# Patient Record
Sex: MALE | Race: WHITE | URBAN - METROPOLITAN AREA
[De-identification: names, ages, dates, MRNs, and addresses within clinical notes are randomized per-mention and may not be internally consistent; named-entity substitution may affect disease eponyms.]

---

## 2017-04-04 RX ORDER — SERTRALINE HYDROCHLORIDE 50 MG/1
50 TABLET, FILM COATED ORAL DAILY
Qty: 90 TAB | Refills: 2 | Status: SHIPPED | OUTPATIENT
Start: 2017-04-04 | End: 2018-01-05 | Stop reason: SDUPTHER

## 2017-09-18 DIAGNOSIS — E78.5 HYPERLIPIDEMIA LDL GOAL <100: Primary | ICD-10-CM

## 2017-11-18 LAB
CHOLEST SERPL-MCNC: 238 MG/DL (ref 100–199)
HDLC SERPL-MCNC: 34 MG/DL
INTERPRETATION, 910389: NORMAL
LDLC SERPL CALC-MCNC: 137 MG/DL (ref 0–99)
TRIGL SERPL-MCNC: 336 MG/DL (ref 0–149)
VLDLC SERPL CALC-MCNC: 67 MG/DL (ref 5–40)

## 2018-01-05 RX ORDER — SERTRALINE HYDROCHLORIDE 50 MG/1
TABLET, FILM COATED ORAL
Qty: 90 TAB | Refills: 0 | Status: SHIPPED | OUTPATIENT
Start: 2018-01-05 | End: 2018-05-05 | Stop reason: SDUPTHER

## 2018-03-12 ENCOUNTER — OFFICE VISIT (OUTPATIENT)
Dept: INTERNAL MEDICINE CLINIC | Age: 43
End: 2018-03-12

## 2018-03-12 VITALS
HEART RATE: 88 BPM | BODY MASS INDEX: 29.35 KG/M2 | HEIGHT: 74 IN | DIASTOLIC BLOOD PRESSURE: 70 MMHG | OXYGEN SATURATION: 98 % | TEMPERATURE: 97.8 F | RESPIRATION RATE: 14 BRPM | SYSTOLIC BLOOD PRESSURE: 110 MMHG | WEIGHT: 228.7 LBS

## 2018-03-12 DIAGNOSIS — F32.1 MODERATE SINGLE CURRENT EPISODE OF MAJOR DEPRESSIVE DISORDER (HCC): ICD-10-CM

## 2018-03-12 DIAGNOSIS — E78.5 HYPERLIPIDEMIA LDL GOAL <100: Primary | ICD-10-CM

## 2018-03-12 NOTE — PROGRESS NOTES
Subjective:     Chief Complaint   Patient presents with   [address]     He  is a 40y.o. year old male who presents for evaluation. Has a history of HLD. Is pescevegan now. Since last year in January is exercising regularly (walking). No cardiovascular symptoms. Is doing well on sertraline. Historical Data    Past Medical History:   Diagnosis Date   [facility] fever         No past surgical history on file. Outpatient Encounter Prescriptions as of 3/12/2018   Medication Sig Dispense Refill    sertraline (ZOLOFT) 50 mg tablet TAKE 1 TABLET BY MOUTH ONCE DAILY. 90 Tab 0    Cetirizine (ZYRTEC) 10 mg cap Take  by mouth. No facility-administered encounter medications on file as of 3/12/2018. No Known Allergies     Social History     Social History    Marital status:      Spouse name: N/A    Number of children: N/A    Years of education: N/A     Occupational History    Not on file. Social History Main Topics    Smoking status: Never Smoker    Smokeless tobacco: Never Used    Alcohol use No    Drug use: No    Sexual activity: Yes     Partners: Female     Other Topics Concern    Not on file     Social History Narrative        Review of Systems  A comprehensive review of systems was negative except for that written in the HPI. Objective:     Vitals:    03/12/18 1324   BP: 110/70   Pulse: 88   Resp: 14   Temp: 97.8 °F (36.6 °C)   TempSrc: Oral   SpO2: 98%   Weight: 228 lb 11.2 oz (103.7 kg)   Height: 6' 1.5\" (1.867 m)     Pleasant WM in no acute distress. Neck: Supple  Cardiac: RRR without murmurs, gallops or rubs. Lungs: Clear     ASSESSMENT / PLAN:   1. Hyperlipidemia LDL goal <100  · Continue dietary efforts.  - LIPID PANEL; Future  - METABOLIC PANEL, COMPREHENSIVE; Future    2. Moderate single current episode of major depressive disorder ([facility] [address]  · Doing well on sertraline. Patient Instructions   Continue to follow a nancy-vegan type diet.   Stay physically active. Continue sertraline. Have fasting blood work evaluation. Follow-up Disposition:  Return in about 6 months (around 9/12/2018) for F/U HLD. Advised him to call back or return to office if symptoms worsen/change/persist.  Discussed expected course/resolution/complications of diagnosis in detail with patient. Medication risks/benefits/costs/interactions/alternatives discussed with patient. He was given an after visit summary which includes diagnoses, current medications, & vitals. He expressed understanding with the diagnosis and plan.

## 2018-03-12 NOTE — MR AVS SNAPSHOT
[address] [facility], [address] [place] [address] 
[phone] Patient: Za Chapman MRN: [id number] HTQ:[id number] Visit Information Date & Time Provider Department Dept. Phone Encounter #  
 3/12/2018  1:15 PM MD [facility] [phone] Follow-up Instructions Return in about 6 months (around 9/12/2018) for F/U HLD. Upcoming Health Maintenance Date Due DTaP/Tdap/Td series (2 - Td) 7/21/2019 Allergies as of 3/12/2018  Review Complete On: 3/12/2018 By: Avery Ansari MD  
 No Known Allergies Current Immunizations  Reviewed on 3/12/2018 Name Date Hep A Vaccine 7/21/2009, 3/8/1999 Hep B Vaccine 5/4/1999, 4/9/1999 Influenza Vaccine 12/1/2017 [address] Encephalitis Vaccine 4/23/1999, 3/30/1999, 3/8/1999 Meningococcal ACWY Vaccine 3/8/1999 Poliovirus vaccine 5/28/1999 Rabies Vaccine 5/28/1999, 5/4/1999 Td 3/8/1999 Tdap 7/21/2009 Typhoid Vaccine 3/8/1999 Yellow Fever Vaccine 7/28/2009 Reviewed by Issac Davis LPN on 2/50/2624 at  1:25 PM  
You Were Diagnosed With   
  
 Codes Comments Hyperlipidemia LDL goal <100    -  Primary ICD-10-CM: E78.5 ICD-9-CM: 272.4 Moderate single current episode of major depressive disorder (HCC)     ICD-10-CM: F32.1 ICD-9-CM: 296.22 Vitals BP Pulse Temp Resp Height(growth percentile) Weight(growth percentile) 110/70 (BP 1 Location: Left arm, BP Patient Position: Sitting) 88 97.8 °F (36.6 °C) (Oral) 14 6' 1.5\" (1.867 m) 228 lb 11.2 oz (103.7 kg) SpO2 BMI Smoking Status 98% 29.76 kg/m2 Never Smoker Vitals History BMI and BSA Data Body Mass Index Body Surface Area  
 29.76 kg/m 2 2.32 m 2 Preferred Pharmacy Pharmacy Name Phone [facility] [address] [place] [address] [phone] Your Updated Medication List  
  
   
 This list is accurate as of 3/12/18  1:36 PM.  Always use your most recent med list.  
  
  
  
  
 sertraline 50 mg tablet Commonly known as:  ZOLOFT  
TAKE 1 TABLET BY MOUTH ONCE DAILY. ZyrTEC 10 mg Cap Generic drug:  Cetirizine Take  by mouth. Follow-up Instructions Return in about 6 months (around 9/12/2018) for F/U HLD. To-Do List   
 03/13/2018 Lab:  LIPID PANEL   
  
 03/13/2018 Lab:  METABOLIC PANEL, COMPREHENSIVE Patient Instructions Continue to follow a nancy-vegan type diet. Stay physically active. Continue sertraline. Have fasting blood work evaluation. Introducing [facility]! Dear Shari Love: Thank you for requesting a [facility] [facility] account. Our records indicate that you already have an active [employer] account. You can access your account anytime at [email or web address] Did you know that you can access your hospital and ER discharge instructions at any time in [employer]? You can also review all of your test results from your hospital stay or ER visit. Additional Information If you have questions, please visit the Frequently Asked Questions section of the [employer] website at [email or web address] Remember, [facility] is NOT to be used for urgent needs. For medical emergencies, dial 911. Now available from your iPhone and Android! Please provide this summary of care documentation to your next provider. Your primary care clinician is listed as Serafin Hightower. If you have any questions after today's visit, please call [phone].

## 2018-03-12 NOTE — PROGRESS NOTES
Chief Complaint   Patient presents with   [facility] Establish Care     Reviewed record in preparation for visit and have obtained necessary documentation. Identified pt with two pt identifiers(name and ). There are no preventive care reminders to display for this patient. Chief Complaint   Patient presents with   [facility] Readings from Last 3 Encounters:   18 228 lb 11.2 oz (103.7 kg)   16 217 lb (98.4 kg)   08/04/15 208 lb (94.3 kg)     Temp Readings from Last 3 Encounters:   18 97.8 °F (36.6 °C) (Oral)   16 98.5 °F (36.9 °C) (Oral)   08/04/15 98.4 °F (36.9 °C) (Oral)     BP Readings from Last 3 Encounters:   18 110/70   16 110/72   08/04/15 116/78     Pulse Readings from Last 3 Encounters:   18 88   16 78   08/04/15 77           Learning Assessment:  :     Learning Assessment 2015   PRIMARY LEARNER Patient   HIGHEST LEVEL OF EDUCATION - PRIMARY LEARNER  > 4 YEARS OF COLLEGE   BARRIERS PRIMARY LEARNER NONE     NONE   PRIMARY LANGUAGE ENGLISH   LEARNER PREFERENCE PRIMARY READING   ANSWERED BY patient   RELATIONSHIP SELF       Depression Screening:  :     PHQ over the last two weeks 2015   Little interest or pleasure in doing things Not at all   Feeling down, depressed or hopeless Not at all   Total Score PHQ 2 0       Fall Risk Assessment:  :     No flowsheet data found. Abuse Screening:  :     Abuse Screening Questionnaire 2015   Do you ever feel afraid of your partner? N   Are you in a relationship with someone who physically or mentally threatens you? N   Is it safe for you to go home?  Y       Coordination of Care Questionnaire:  :     1) Have you been to an emergency room, urgent care clinic since your last visit? no   Hospitalized since your last visit? no             2) Have you seen or consulted any other health care providers outside of [address] [place] [address] since your last visit? no  (Include any pap smears or colon screenings in this section.)    3) Do you have an Advance Directive on file? no    4) Are you interested in receiving information on Advance Directives? NO      Patient is accompanied by self I have received verbal consent from Eliza Lesches to discuss any/all medical information while they are present in the room. Reviewed record  In preparation for visit and have obtained necessary documentation.

## 2018-03-12 NOTE — PATIENT INSTRUCTIONS
Continue to follow a nancy-vegan type diet. Stay physically active. Continue sertraline. Have fasting blood work evaluation.

## 2018-03-15 DIAGNOSIS — R19.7 DIARRHEA, UNSPECIFIED TYPE: Primary | ICD-10-CM

## 2018-04-30 LAB
GLIADIN PEPTIDE IGA SER-ACNC: 1 UNITS (ref 0–19)
GLIADIN PEPTIDE IGG SER-ACNC: 2 UNITS (ref 0–19)
IGA SERPL-MCNC: 128 MG/DL (ref 90–386)
TTG IGA SER-ACNC: <2 U/ML (ref 0–3)
TTG IGG SER-ACNC: <2 U/ML (ref 0–5)

## 2018-05-07 RX ORDER — SERTRALINE HYDROCHLORIDE 50 MG/1
TABLET, FILM COATED ORAL
Qty: 90 TAB | Refills: 3 | Status: SHIPPED | OUTPATIENT
Start: 2018-05-07 | End: 2021-11-16 | Stop reason: SDUPTHER

## 2020-06-19 ENCOUNTER — VIRTUAL VISIT (OUTPATIENT)
Dept: PRIMARY CARE CLINIC | Age: 45
End: 2020-06-19

## 2020-06-19 DIAGNOSIS — E78.00 HYPERCHOLESTEREMIA: ICD-10-CM

## 2020-06-19 DIAGNOSIS — R73.01 IFG (IMPAIRED FASTING GLUCOSE): ICD-10-CM

## 2020-06-19 DIAGNOSIS — F32.A DEPRESSION, UNSPECIFIED DEPRESSION TYPE: Primary | ICD-10-CM

## 2020-06-19 RX ORDER — SERTRALINE HYDROCHLORIDE 50 MG/1
50 TABLET, FILM COATED ORAL DAILY
Qty: 90 TAB | Refills: 1 | Status: SHIPPED | OUTPATIENT
Start: 2020-06-19 | End: 2020-12-18

## 2020-06-19 NOTE — PROGRESS NOTES
Allison Sierra is a 41 y.o. male who was seen by synchronous (real-time) audio-video technology on 2020. Consent: Allison Sierra, who was seen by synchronous (real-time) audio-video technology, and/or his healthcare decision maker, is aware that this patient-initiated, Telehealth encounter on 2020 is a billable service, with coverage as determined by his insurance carrier. He is aware that he may receive a bill and has provided verbal consent to proceed: Yes. Assessment & Plan:   Diagnoses and all orders for this visit:    1. Depression, unspecified depression type  -     sertraline (ZOLOFT) 50 mg tablet; Take 1 Tab by mouth daily. 2. Hypercholesteremia  -     LIPID PANEL  -     METABOLIC PANEL, COMPREHENSIVE    3. IFG (impaired fasting glucose)  -     HEMOGLOBIN A1C WITH EAG        I spent at least 20  minutes on this visit with this new patient. Subjective:   Allison Sierra is a 41 y.o. male who was seen for Depression      Prior to Admission medications    Medication Sig Start Date End Date Taking? Authorizing Provider   sertraline (ZOLOFT) 50 mg tablet TAKE 1 TABLET BY MOUTH ONCE DAILY. 18   Michael Adams MD   Cetirizine (ZYRTEC) 10 mg cap Take  by mouth. Provider, Historical     No Known Allergies    HIstory    Pt is establishing care. Pt used to see Dr Katiuska Hernandez for depression who has sicne retired. Pt says he is stable on zoloft 50 mg daily. 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 He denies chest pain, SOB, palpitations. Has a  baby and sometimes does not sleep well. ROS    Objective:   Vital Signs: (As obtained by patient/caregiver at home)  There were no vitals taken for this visit.      [INSTRUCTIONS:  \"[x]\" Indicates a positive item  \"[]\" Indicates a negative item  -- DELETE ALL ITEMS NOT EXAMINED]    Constitutional: [x] Appears well-developed and well-nourished [x] No apparent distress      [] Abnormal -     Mental status: [x] Alert and awake  [x] Oriented to person/place/time [x] Able to follow commands    [] Abnormal -     Eyes:   EOM    [x]  Normal    [] Abnormal -   Sclera  [x]  Normal    [] Abnormal -          Discharge [x]  None visible   [] Abnormal -     HENT: [x] Normocephalic, atraumatic  [] Abnormal -   [x] Mouth/Throat: Mucous membranes are moist    External Ears [x] Normal  [] Abnormal -    Neck: [x] No visualized mass [] Abnormal -     Pulmonary/Chest: [x] Respiratory effort normal   [x] No visualized signs of difficulty breathing or respiratory distress        [] Abnormal -      Musculoskeletal:   [x] Normal gait with no signs of ataxia         [x] Normal range of motion of neck        [] Abnormal -     Neurological:        [x] No Facial Asymmetry (Cranial nerve 7 motor function) (limited exam due to video visit)          [x] No gaze palsy        [] Abnormal -          Skin:        [x] No significant exanthematous lesions or discoloration noted on facial skin         [] Abnormal -            Psychiatric:       [x] Normal Affect [] Abnormal -        [x] No Hallucinations    Other pertinent observable physical exam findings:-        We discussed the expected course, resolution and complications of the diagnosis(es) in detail. Medication risks, benefits, costs, interactions, and alternatives were discussed as indicated. I advised him to contact the office if his condition worsens, changes or fails to improve as anticipated. He expressed understanding with the diagnosis(es) and plan. Edward Murray is a 41 y.o. male who was evaluated by a video visit encounter for concerns as above. Patient identification was verified prior to start of the visit. A caregiver was present when appropriate.  Due to this being a TeleHealth encounter ([facility] public health emergency), evaluation of the following organ systems was limited: Vitals/Constitutional/EENT/Resp/CV/GI//MS/Neuro/Skin/Heme-Lymph-Imm. Pursuant to the emergency declaration under the [address] [place] [address], [facility], [facility] waiver authority and the [employer] Act, this Virtual  Visit was conducted, with patient's (and/or legal guardian's) consent, to reduce the patient's risk of exposure to COVID-19 and provide necessary medical care. Services were provided through a video synchronous discussion virtually to substitute for in-person clinic visit. Patient and provider were located at their individual homes.       Howard Leger MD

## 2020-12-18 DIAGNOSIS — F32.A DEPRESSION, UNSPECIFIED DEPRESSION TYPE: ICD-10-CM

## 2020-12-18 RX ORDER — SERTRALINE HYDROCHLORIDE 50 MG/1
TABLET, FILM COATED ORAL
Qty: 90 TAB | Refills: 0 | Status: SHIPPED | OUTPATIENT
Start: 2020-12-18 | End: 2021-05-13 | Stop reason: SDUPTHER

## 2020-12-24 NOTE — TELEPHONE ENCOUNTER
Called pt to schedule 2 month f/u appt, no answer, left message. If pt return call, please schedule appt.

## 2021-03-23 RX ORDER — SERTRALINE HYDROCHLORIDE 50 MG/1
TABLET, FILM COATED ORAL
Qty: 90 TAB | Refills: 3 | OUTPATIENT
Start: 2021-03-23

## 2021-05-11 DIAGNOSIS — F32.A DEPRESSION, UNSPECIFIED DEPRESSION TYPE: ICD-10-CM

## 2021-05-11 RX ORDER — SERTRALINE HYDROCHLORIDE 50 MG/1
TABLET, FILM COATED ORAL
Qty: 90 TAB | Refills: 0 | Status: CANCELLED | OUTPATIENT
Start: 2021-05-11

## 2021-05-13 ENCOUNTER — VIRTUAL VISIT (OUTPATIENT)
Dept: PRIMARY CARE CLINIC | Age: 46
End: 2021-05-13
Payer: COMMERCIAL

## 2021-05-13 DIAGNOSIS — E55.9 VITAMIN D DEFICIENCY: ICD-10-CM

## 2021-05-13 DIAGNOSIS — E78.00 HYPERCHOLESTEREMIA: Primary | ICD-10-CM

## 2021-05-13 DIAGNOSIS — R73.01 IFG (IMPAIRED FASTING GLUCOSE): ICD-10-CM

## 2021-05-13 DIAGNOSIS — F32.A DEPRESSION, UNSPECIFIED DEPRESSION TYPE: ICD-10-CM

## 2021-05-13 PROCEDURE — 99213 OFFICE O/P EST LOW 20 MIN: CPT | Performed by: INTERNAL MEDICINE

## 2021-05-13 RX ORDER — SERTRALINE HYDROCHLORIDE 50 MG/1
50 TABLET, FILM COATED ORAL DAILY
Qty: 90 TAB | Refills: 1 | Status: SHIPPED | OUTPATIENT
Start: 2021-05-13 | End: 2021-11-10

## 2021-05-13 NOTE — PROGRESS NOTES
Nate Crenshaw is a 40 y.o. male who was seen by synchronous (real-time) audio-video technology on 5/13/2021 for Depression, Cholesterol Problem, and Vitamin D Deficiency        Assessment & Plan:   Diagnoses and all orders for this visit:    1. Hypercholesteremia  -     CBC WITH AUTOMATED DIFF; Future  -     METABOLIC PANEL, COMPREHENSIVE; Future  -     LIPID PANEL; Future    2. Depression, unspecified depression type  -     sertraline (ZOLOFT) 50 mg tablet; Take 1 Tab by mouth daily. 3. IFG (impaired fasting glucose)  -     HEMOGLOBIN A1C WITH EAG; Future    4. Vitamin D deficiency  -     VITAMIN D, 25 HYDROXY; Future        I spent at least 30 minutes on this visit with this established patient. Subjective:       Prior to Admission medications    Medication Sig Start Date End Date Taking? Authorizing Provider   sertraline (ZOLOFT) 50 mg tablet Take 1 Tab by mouth daily. 5/13/21  Yes Jailene Espinosa MD   sertraline (ZOLOFT) 50 mg tablet TAKE 1 TABLET BY MOUTH EVERY DAY 12/18/20 5/13/21  Jailene Espinosa MD   sertraline (ZOLOFT) 50 mg tablet TAKE 1 TABLET BY MOUTH ONCE DAILY. 5/7/18   Jona Zamora MD   Cetirizine (ZYRTEC) 10 mg cap Take  by mouth. Provider, Historical     History    Pt decided to reduce the dose to half. After 8-9 days he started feeling depressed. He went back to normal dosage. Pt does want to get his labs checked for hyperchol , vitamin D def. He has h/o prediabetes. He has been feeling okay. 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     ROS    Objective:     Patient-Reported Vitals 5/12/2021   Patient-Reported Weight 225   Patient-Reported Height 6'2        [INSTRUCTIONS:  \"[x]\" Indicates a positive item  \"[]\" Indicates a negative item  -- DELETE ALL ITEMS NOT EXAMINED]    Constitutional: [x] Appears well-developed and well-nourished [x] No apparent distress      [] Abnormal -     Mental status: [x] Alert and awake  [x] Oriented to person/place/time [x] Able to follow commands    [] Abnormal -     Eyes:   EOM    [x]  Normal    [] Abnormal -   Sclera  [x]  Normal    [] Abnormal -          Discharge [x]  None visible   [] Abnormal -     HENT: [x] Normocephalic, atraumatic  [] Abnormal -   [x] Mouth/Throat: Mucous membranes are moist    External Ears [x] Normal  [] Abnormal -    Neck: [x] No visualized mass [] Abnormal -     Pulmonary/Chest: [x] Respiratory effort normal   [x] No visualized signs of difficulty breathing or respiratory distress        [] Abnormal -      Musculoskeletal:   [x] Normal gait with no signs of ataxia         [x] Normal range of motion of neck        [] Abnormal -     Neurological:        [x] No Facial Asymmetry (Cranial nerve 7 motor function) (limited exam due to video visit)          [x] No gaze palsy        [] Abnormal -          Skin:        [x] No significant exanthematous lesions or discoloration noted on facial skin         [] Abnormal -            Psychiatric:       [x] Normal Affect [] Abnormal -        [x] No Hallucinations    Other pertinent observable physical exam findings:-        We discussed the expected course, resolution and complications of the diagnosis(es) in detail. Medication risks, benefits, costs, interactions, and alternatives were discussed as indicated. I advised him to contact the office if his condition worsens, changes or fails to improve as anticipated. He expressed understanding with the diagnosis(es) and plan. Tracee Ryder, was evaluated through a synchronous (real-time) audio-video encounter. The patient (or guardian if applicable) is aware that this is a billable service. Verbal consent to proceed has been obtained within the past 12 months. The visit was conducted pursuant to the emergency declaration under the [address] [place] [address] and the [email or web address] and [employer] General Act.   Patient identification was verified, and a caregiver was present when appropriate. The patient was located in a state where the provider was credentialed to provide care.       Catarina Jenkins MD

## 2021-05-20 ENCOUNTER — TELEPHONE (OUTPATIENT)
Dept: PRIMARY CARE CLINIC | Age: 46
End: 2021-05-20

## 2021-05-20 NOTE — TELEPHONE ENCOUNTER
----- Message from Becki Linares sent at 5/20/2021 11:35 AM EDT -----  Regarding: telephone  General Message/Vendor Calls    Caller's first and last name: PT       Reason for call: PT states the doctor was supposed to mail him a work order for labs that the PT needs done but they havent been received yet.        Callback required yes/no and why: yes to go over labs       Best contact number(s): [phone]      Details to clarify the request:      Becki Linares

## 2021-06-19 LAB
25(OH)D3+25(OH)D2 SERPL-MCNC: 14.1 NG/ML (ref 30–100)
ALBUMIN SERPL-MCNC: 4.9 G/DL (ref 4–5)
ALBUMIN/GLOB SERPL: 2.1 {RATIO} (ref 1.2–2.2)
ALP SERPL-CCNC: 71 IU/L (ref 48–121)
ALT SERPL-CCNC: 37 IU/L (ref 0–44)
AST SERPL-CCNC: 19 IU/L (ref 0–40)
BASOPHILS # BLD AUTO: 0 X10E3/UL (ref 0–0.2)
BASOPHILS NFR BLD AUTO: 1 %
BILIRUB SERPL-MCNC: 1 MG/DL (ref 0–1.2)
BUN SERPL-MCNC: 17 MG/DL (ref 6–24)
BUN/CREAT SERPL: 17 (ref 9–20)
CALCIUM SERPL-MCNC: 9.9 MG/DL (ref 8.7–10.2)
CHLORIDE SERPL-SCNC: 105 MMOL/L (ref 96–106)
CHOLEST SERPL-MCNC: 247 MG/DL (ref 100–199)
CO2 SERPL-SCNC: 24 MMOL/L (ref 20–29)
CREAT SERPL-MCNC: 1.02 MG/DL (ref 0.76–1.27)
EOSINOPHIL # BLD AUTO: 0.1 X10E3/UL (ref 0–0.4)
EOSINOPHIL NFR BLD AUTO: 2 %
ERYTHROCYTE [DISTWIDTH] IN BLOOD BY AUTOMATED COUNT: 13.1 % (ref 11.6–15.4)
EST. AVERAGE GLUCOSE BLD GHB EST-MCNC: 108 MG/DL
GLOBULIN SER CALC-MCNC: 2.3 G/DL (ref 1.5–4.5)
GLUCOSE SERPL-MCNC: 96 MG/DL (ref 65–99)
HBA1C MFR BLD: 5.4 % (ref 4.8–5.6)
HCT VFR BLD AUTO: 44.3 % (ref 37.5–51)
HDLC SERPL-MCNC: 30 MG/DL
HGB BLD-MCNC: 14.9 G/DL (ref 13–17.7)
IMM GRANULOCYTES # BLD AUTO: 0 X10E3/UL (ref 0–0.1)
IMM GRANULOCYTES NFR BLD AUTO: 0 %
LDLC SERPL CALC-MCNC: 132 MG/DL (ref 0–99)
LYMPHOCYTES # BLD AUTO: 1.8 X10E3/UL (ref 0.7–3.1)
LYMPHOCYTES NFR BLD AUTO: 34 %
MCH RBC QN AUTO: 29.3 PG (ref 26.6–33)
MCHC RBC AUTO-ENTMCNC: 33.6 G/DL (ref 31.5–35.7)
MCV RBC AUTO: 87 FL (ref 79–97)
MONOCYTES # BLD AUTO: 0.4 X10E3/UL (ref 0.1–0.9)
MONOCYTES NFR BLD AUTO: 8 %
NEUTROPHILS # BLD AUTO: 3 X10E3/UL (ref 1.4–7)
NEUTROPHILS NFR BLD AUTO: 55 %
PLATELET # BLD AUTO: 235 X10E3/UL (ref 150–450)
POTASSIUM SERPL-SCNC: 4.2 MMOL/L (ref 3.5–5.2)
PROT SERPL-MCNC: 7.2 G/DL (ref 6–8.5)
RBC # BLD AUTO: 5.09 X10E6/UL (ref 4.14–5.8)
SODIUM SERPL-SCNC: 143 MMOL/L (ref 134–144)
TRIGL SERPL-MCNC: 463 MG/DL (ref 0–149)
VLDLC SERPL CALC-MCNC: 85 MG/DL (ref 5–40)
WBC # BLD AUTO: 5.4 X10E3/UL (ref 3.4–10.8)

## 2021-11-16 ENCOUNTER — OFFICE VISIT (OUTPATIENT)
Dept: PRIMARY CARE CLINIC | Age: 46
End: 2021-11-16
Payer: COMMERCIAL

## 2021-11-16 VITALS
HEART RATE: 69 BPM | RESPIRATION RATE: 18 BRPM | HEIGHT: 74 IN | SYSTOLIC BLOOD PRESSURE: 109 MMHG | OXYGEN SATURATION: 96 % | WEIGHT: 234.2 LBS | BODY MASS INDEX: 30.06 KG/M2 | DIASTOLIC BLOOD PRESSURE: 68 MMHG | TEMPERATURE: 97.3 F

## 2021-11-16 DIAGNOSIS — L91.8 SKIN TAG: ICD-10-CM

## 2021-11-16 DIAGNOSIS — Z23 ENCOUNTER FOR IMMUNIZATION: ICD-10-CM

## 2021-11-16 DIAGNOSIS — F32.A DEPRESSION, UNSPECIFIED DEPRESSION TYPE: ICD-10-CM

## 2021-11-16 DIAGNOSIS — E55.9 VITAMIN D DEFICIENCY: ICD-10-CM

## 2021-11-16 DIAGNOSIS — E78.00 HYPERCHOLESTEREMIA: Primary | ICD-10-CM

## 2021-11-16 PROCEDURE — 11200 RMVL SKIN TAGS UP TO&INC 15: CPT | Performed by: INTERNAL MEDICINE

## 2021-11-16 PROCEDURE — 90471 IMMUNIZATION ADMIN: CPT | Performed by: INTERNAL MEDICINE

## 2021-11-16 PROCEDURE — 99214 OFFICE O/P EST MOD 30 MIN: CPT | Performed by: INTERNAL MEDICINE

## 2021-11-16 PROCEDURE — 90686 IIV4 VACC NO PRSV 0.5 ML IM: CPT | Performed by: INTERNAL MEDICINE

## 2021-11-16 RX ORDER — SERTRALINE HYDROCHLORIDE 50 MG/1
50 TABLET, FILM COATED ORAL DAILY
Qty: 90 TABLET | Refills: 1 | Status: SHIPPED | OUTPATIENT
Start: 2021-11-16 | End: 2022-08-19 | Stop reason: SDUPTHER

## 2021-11-16 RX ORDER — ATORVASTATIN CALCIUM 20 MG/1
20 TABLET, FILM COATED ORAL DAILY
Qty: 90 TABLET | Refills: 1 | Status: SHIPPED | OUTPATIENT
Start: 2021-11-16 | End: 2022-11-04

## 2021-11-16 RX ORDER — ERGOCALCIFEROL 1.25 MG/1
50000 CAPSULE ORAL
Qty: 12 CAPSULE | Refills: 1 | Status: SHIPPED | OUTPATIENT
Start: 2021-11-16

## 2021-11-16 NOTE — PROGRESS NOTES
Chief Complaint   Patient presents with    Depression    Cholesterol Problem        Visit Vitals  /68 (BP 1 Location: Right upper arm, BP Patient Position: Sitting)   Pulse 69   Temp 97.3 °F (36.3 °C) (Temporal)   Resp 18   Ht 6' 1.5\" (1.867 m)   Wt 234 lb 3.2 oz (106.2 kg)   SpO2 96%   BMI 30.48 kg/m²        1. Have you been to the ER, urgent care clinic since your last visit? Hospitalized since your last visit? No    2. Have you seen or consulted any other health care providers outside of the [address] [place] [address] since your last visit? Include any pap smears or colon screening.  No

## 2021-11-16 NOTE — PROGRESS NOTES
Maggie Harris is a 40 y.o.  male and presents with     Chief Complaint   Patient presents with    Depression    Cholesterol Problem     Pt  Is here for follow up. Pts fatehr passed away at around age 80. Pt did not have great contact with his father. Mom is still alive and doing fine  Pt does not smoke or drink alcohol. Patient has tried diet and exercise in the past and his cholesterol still stayed elevated. He is willing to try medications at this point. He reports that the sertraline is working very well for his depression. He tried to wean himself off but had some side effects. He was wondering if it is okay to continue the medication. He has a skin tag on his right inner thigh and wants it removed as it keeps causing irritation. Past Medical History:   Diagnosis Date   [address] [facility] fever      No past surgical history on file. Current Outpatient Medications   Medication Sig    ergocalciferol (ERGOCALCIFEROL) 1,250 mcg (50,000 unit) capsule Take 1 Capsule by mouth every seven (7) days.  atorvastatin (LIPITOR) 20 mg tablet Take 1 Tablet by mouth daily.  sertraline (ZOLOFT) 50 mg tablet Take 1 Tablet by mouth daily.  Cetirizine (ZYRTEC) 10 mg cap Take  by mouth. No current facility-administered medications for this visit.      Health Maintenance   Topic Date Due    Hepatitis C Screening  Never done    COVID-19 Vaccine (1) Never done    DTaP/Tdap/Td series (2 - Td or Tdap) 07/21/2019    Colorectal Cancer Screening Combo  Never done    Lipid Screen  06/18/2026    Flu Vaccine  Completed    Pneumococcal 0-64 years  Aged [employer] History   Administered Date(s) Administered    Hep A Vaccine 03/08/1999, 07/21/2009    Hep B Vaccine 04/09/1999, 05/04/1999    Influenza Vaccine 12/01/2017    Influenza Vaccine (Quad) PF (>6 Mo Flulaval, Fluarix, and >3 Yrs Afluria, Fluzone 07288) 11/16/2021    Japanese Encephalitis Vaccine 03/08/1999, 03/30/1999, 04/23/1999    Meningococcal ACWY Vaccine 03/08/1999    Poliovirus vaccine 05/28/1999    Rabies Vaccine 05/04/1999, 05/28/1999    Td 03/08/1999    Tdap 07/21/2009    Typhoid Vaccine 03/08/1999    Yellow Fever Vaccine 07/28/2009     No LMP for male patient. Allergies and Intolerances:   No Known Allergies    Family History:   Family History   Problem Relation Age of Onset    Alcohol abuse Mother     Psychiatric Disorder Mother     Diabetes Father     Heart Disease Father        Social History:   He  reports that he has never smoked. He has never used smokeless tobacco.  He  reports no history of alcohol use.             Review of Systems:   General: negative for - chills, fatigue, fever, weight change  Psych: negative for - anxiety, depression, irritability or mood swings  ENT: negative for - headaches, hearing change, nasal congestion, oral lesions, sneezing or sore throat  Heme/ Lymph: negative for - bleeding problems, bruising, pallor or swollen lymph nodes  Endo: negative for - hot flashes, polydipsia/polyuria or temperature intolerance  Resp: negative for - cough, shortness of breath or wheezing  CV: negative for - chest pain, edema or palpitations  GI: negative for - abdominal pain, change in bowel habits, constipation, diarrhea or nausea/vomiting  : negative for - dysuria, hematuria, incontinence, pelvic pain or vulvar/vaginal symptoms  MSK: negative for - joint pain, joint swelling or muscle pain  Neuro: negative for - confusion, headaches, seizures or weakness  Derm: negative for - dry skin, hair changes, rash or skin lesion changes          Physical:   Vitals:   Vitals:    11/16/21 1117   BP: 109/68   Pulse: 69   Resp: 18   Temp: 97.3 °F (36.3 °C)   TempSrc: Temporal   SpO2: 96%   Weight: 234 lb 3.2 oz (106.2 kg)   Height: 6' 1.5\" (1.867 m)           Exam:   HEENT- atraumatic,normocephalic, awake, oriented, well nourished  Neck - supple,no enlarged lymph nodes, no JVD, no thyromegaly  Chest- CTA, no rhonchi, no crackles  Heart- rrr, no murmurs / gallop/rub  Abdomen- soft,BS+,NT, no hepatosplenomegaly  Ext - no c/c/edema , skin tag on the right upper thigh  Neuro- no focal deficits. Power 5/5 all extremities  Skin - warm,dry, no obvious rashes. Review of Data:   LABS:   Lab Results   Component Value Date/Time    WBC 5.4 06/18/2021 09:47 AM    HGB 14.9 06/18/2021 09:47 AM    HCT 44.3 06/18/2021 09:47 AM    PLATELET 474 62/67/9438 09:47 AM     Lab Results   Component Value Date/Time    Sodium 143 06/18/2021 09:47 AM    Potassium 4.2 06/18/2021 09:47 AM    Chloride 105 06/18/2021 09:47 AM    CO2 24 06/18/2021 09:47 AM    Glucose 96 06/18/2021 09:47 AM    BUN 17 06/18/2021 09:47 AM    Creatinine 1.02 06/18/2021 09:47 AM     Lab Results   Component Value Date/Time    Cholesterol, total 247 (H) 06/18/2021 09:47 AM    HDL Cholesterol 30 (L) 06/18/2021 09:47 AM    LDL, calculated 132 (H) 06/18/2021 09:47 AM    LDL, calculated Comment 04/26/2018 10:03 AM    Triglyceride 463 (H) 06/18/2021 09:47 AM     No components found for: GPT        Impression / Plan:        ICD-10-CM ICD-9-CM    1. Hypercholesteremia  E78.00 272.0 LIPID PANEL      METABOLIC PANEL, COMPREHENSIVE      atorvastatin (LIPITOR) 20 mg tablet   2. Encounter for immunization  Z23 V03.89 INFLUENZA VIRUS VAC QUAD,SPLIT,PRESV FREE SYRINGE IM   3. Depression, unspecified depression type  F32. A 311 sertraline (ZOLOFT) 50 mg tablet   4. Vitamin D deficiency  E55.9 268.9 ergocalciferol (ERGOCALCIFEROL) 1,250 mcg (50,000 unit) capsule      VITAMIN D, 25 HYDROXY   5. Skin tag  L91.8 701.9 REMOVAL OF SKIN TAGS       Procedure note    Indication-skin tag on right lower extremity, irritation to the skin    After obtaining written consent, part was cleaned with iodine and alcohol. The base of the skin tag was injected with lidocaine and epinephrine. Using sterile scissors and forceps skin tag was removed. Dressing was applied. No complications noted.   No bleeding. Patient tolerated procedure well. Explained to patient risk benefits of the medications. Advised patient to stop meds if having any side effects. Pt verbalized understanding of the instructions. I have discussed the diagnosis with the patient and the intended plan as seen in the above orders. The patient has received an after-visit summary and questions were answered concerning future plans. I have discussed medication side effects and warnings with the patient as well. I have reviewed the plan of care with the patient, accepted their input and they are in agreement with the treatment goals. Reviewed plan of care. Patient has provided input and agrees with goals. Follow-up and Dispositions    · Return in about 4 months (around 3/16/2022).          Bora Branch MD

## 2022-08-19 ENCOUNTER — OFFICE VISIT (OUTPATIENT)
Dept: PRIMARY CARE CLINIC | Age: 47
End: 2022-08-19
Payer: COMMERCIAL

## 2022-08-19 VITALS
HEART RATE: 71 BPM | RESPIRATION RATE: 16 BRPM | SYSTOLIC BLOOD PRESSURE: 117 MMHG | TEMPERATURE: 97.5 F | WEIGHT: 233.2 LBS | BODY MASS INDEX: 29.93 KG/M2 | DIASTOLIC BLOOD PRESSURE: 72 MMHG | OXYGEN SATURATION: 97 % | HEIGHT: 74 IN

## 2022-08-19 DIAGNOSIS — E55.9 VITAMIN D DEFICIENCY: ICD-10-CM

## 2022-08-19 DIAGNOSIS — F32.A DEPRESSION, UNSPECIFIED DEPRESSION TYPE: ICD-10-CM

## 2022-08-19 DIAGNOSIS — E78.00 HYPERCHOLESTEREMIA: ICD-10-CM

## 2022-08-19 DIAGNOSIS — I10 HYPERTENSION, UNSPECIFIED TYPE: Primary | ICD-10-CM

## 2022-08-19 LAB
COMMENT, HOLDF: NORMAL
SAMPLES BEING HELD,HOLD: NORMAL

## 2022-08-19 PROCEDURE — 93000 ELECTROCARDIOGRAM COMPLETE: CPT | Performed by: INTERNAL MEDICINE

## 2022-08-19 PROCEDURE — 99213 OFFICE O/P EST LOW 20 MIN: CPT | Performed by: INTERNAL MEDICINE

## 2022-08-19 NOTE — PROGRESS NOTES
Chief Complaint   Patient presents with    Follow-up       There were no vitals taken for this visit. 1. Have you been to the ER, urgent care clinic since your last visit? Hospitalized since your last visit? No    2. Have you seen or consulted any other health care providers outside of the [address] [place] [address] since your last visit? Include any pap smears or colon screening.  No

## 2022-08-19 NOTE — PROGRESS NOTES
Mariela Gaston is a 56 y.o.  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 Pt feels well. Past Medical History:   Diagnosis Date    [address] [facility] fever      History reviewed. No pertinent surgical history. Current Outpatient Medications   Medication Sig    ergocalciferol (ERGOCALCIFEROL) 1,250 mcg (50,000 unit) capsule Take 1 Capsule by mouth every seven (7) days. atorvastatin (LIPITOR) 20 mg tablet Take 1 Tablet by mouth daily. sertraline (ZOLOFT) 50 mg tablet Take 1 Tablet by mouth daily. Cetirizine 10 mg cap Take  by mouth. No current facility-administered medications for this visit. Health Maintenance   Topic Date Due    Hepatitis C Screening  Never done    COVID-19 Vaccine (1) Never done    Depression Monitoring  Never done    DTaP/Tdap/Td series (2 - Td or Tdap) 07/21/2019    Colorectal Cancer Screening Combo  Never done    Lipid Screen  06/18/2022    Flu Vaccine (1) 09/01/2022    Pneumococcal 0-64 years  Aged [employer] History   Administered Date(s) Administered    Hep A Vaccine 03/08/1999, 07/21/2009    Hep B Vaccine 04/09/1999, 05/04/1999    Influenza Vaccine 12/01/2017    Influenza, FLUARIX, FLULAVAL, (age 10 mo+) AND AFLURIA, FLUZONE (age 1 y+), PF 11/16/2021    Japanese Encephalitis Vaccine 03/08/1999, 03/30/1999, 04/23/1999    Meningococcal ACWY Vaccine 03/08/1999    Poliovirus vaccine 05/28/1999    Rabies Vaccine 05/04/1999, 05/28/1999    Td 03/08/1999    Tdap 07/21/2009    Typhoid Vaccine 03/08/1999    Yellow Fever Vaccine 07/28/2009     No LMP for male patient.         Allergies and Intolerances:   No Known Allergies    Family History:   Family History   Problem Relation Age of Onset    Alcohol abuse Mother     Psychiatric Disorder Mother     Diabetes Father     Heart Disease Father        Social History:   He  reports that he has never smoked. He has never used smokeless tobacco.  He  reports no history of alcohol use. Review of Systems:   General: negative for - chills, fatigue, fever, weight change  Psych: negative for - anxiety, depression, irritability or mood swings  ENT: negative for - headaches, hearing change, nasal congestion, oral lesions, sneezing or sore throat  Heme/ Lymph: negative for - bleeding problems, bruising, pallor or swollen lymph nodes  Endo: negative for - hot flashes, polydipsia/polyuria or temperature intolerance  Resp: negative for - cough, shortness of breath or wheezing  CV: negative for - chest pain, edema or palpitations  GI: negative for - abdominal pain, change in bowel habits, constipation, diarrhea or nausea/vomiting  : negative for - dysuria, hematuria, incontinence, pelvic pain or vulvar/vaginal symptoms  MSK: negative for - joint pain, joint swelling or muscle pain  Neuro: negative for - confusion, headaches, seizures or weakness  Derm: negative for - dry skin, hair changes, rash or skin lesion changes          Physical:   Vitals:   Vitals:    08/19/22 1007   BP: 117/72   Pulse: 71   Resp: 16   Temp: 97.5 °F (36.4 °C)   TempSrc: Temporal   SpO2: 97%   Weight: 233 lb 3.2 oz (105.8 kg)   Height: 6' 1.5\" (1.867 m)           Exam:   HEENT- atraumatic,normocephalic, awake, oriented, well nourished  Neck - supple,no enlarged lymph nodes, no JVD, no thyromegaly  Chest- CTA, no rhonchi, no crackles  Heart- rrr, no murmurs / gallop/rub  Abdomen- soft,BS+,NT, no hepatosplenomegaly  Ext - no c/c/edema   Neuro- no focal deficits. Power 5/5 all extremities  Skin - warm,dry, no obvious rashes.           Review of Data:   LABS:   Lab Results   Component Value Date/Time    WBC 5.4 06/18/2021 09:47 AM    HGB 14.9 06/18/2021 09:47 AM    HCT 44.3 06/18/2021 09:47 AM    PLATELET 251 06/32/6075 09:47 AM     Lab Results   Component Value Date/Time    Sodium 143 06/18/2021 09:47 AM    Potassium 4.2 06/18/2021 09:47 AM    Chloride 105 06/18/2021 09:47 AM    CO2 24 06/18/2021 09:47 AM    Glucose 96 06/18/2021 09:47 AM    BUN 17 06/18/2021 09:47 AM    Creatinine 1.02 06/18/2021 09:47 AM     Lab Results   Component Value Date/Time    Cholesterol, total 247 (H) 06/18/2021 09:47 AM    HDL Cholesterol 30 (L) 06/18/2021 09:47 AM    LDL, calculated 132 (H) 06/18/2021 09:47 AM    LDL, calculated Comment 04/26/2018 10:03 AM    Triglyceride 463 (H) 06/18/2021 09:47 AM     No components found for: GPT        Impression / Plan:        ICD-10-CM ICD-9-CM    1. Hypertension, unspecified type  I10 401.9 AMB POC EKG ROUTINE W/ 12 LEADS, INTER & REP      2. Depression, unspecified depression type  F32. A 311       3. Hypercholesteremia  E78.00 272.0 LIPID PANEL      METABOLIC PANEL, COMPREHENSIVE      4. Vitamin D deficiency  E55.9 268.9 VITAMIN D, 25 HYDROXY        Addendum    Nurse  accidentally entered EKG in this pts chart. Pt does not have HTN  Nurse has put in a ticket  to remove the EKG and HTN diagnosis from the pt chart  Informed pt that an error occurred on the part of the nurse. Pt was fine with it. He was not concerned about it. Explained to patient risk benefits of the medications. Advised patient to stop meds if having any side effects. Pt verbalized understanding of the instructions. I have discussed the diagnosis with the patient and the intended plan as seen in the above orders. The patient has received an after-visit summary and questions were answered concerning future plans. I have discussed medication side effects and warnings with the patient as well. I have reviewed the plan of care with the patient, accepted their input and they are in agreement with the treatment goals. Reviewed plan of care. Patient has provided input and agrees with goals.         Eder Chiang MD

## 2022-08-19 NOTE — PROGRESS NOTES
1. \"Have you been to the ER, urgent care clinic since your last visit? Hospitalized since your last visit? \" No    2. \"Have you seen or consulted any other health care providers outside of the [address] [place] [address] since your last visit? \" No     3. For patients aged 39-70: Has the patient had a colonoscopy / FIT/ Cologuard?  No      Chief Complaint   Patient presents with    Follow-up

## 2022-08-20 LAB
25(OH)D3 SERPL-MCNC: 58.1 NG/ML (ref 30–100)
ALBUMIN SERPL-MCNC: 4.5 G/DL (ref 3.5–5)
ALBUMIN/GLOB SERPL: 1.5 {RATIO} (ref 1.1–2.2)
ALP SERPL-CCNC: 71 U/L (ref 45–117)
ALT SERPL-CCNC: 62 U/L (ref 12–78)
ANION GAP SERPL CALC-SCNC: 4 MMOL/L (ref 5–15)
AST SERPL-CCNC: 25 U/L (ref 15–37)
BILIRUB SERPL-MCNC: 0.9 MG/DL (ref 0.2–1)
BUN SERPL-MCNC: 15 MG/DL (ref 6–20)
BUN/CREAT SERPL: 14 (ref 12–20)
CALCIUM SERPL-MCNC: 9.5 MG/DL (ref 8.5–10.1)
CHLORIDE SERPL-SCNC: 108 MMOL/L (ref 97–108)
CHOLEST SERPL-MCNC: 158 MG/DL
CO2 SERPL-SCNC: 29 MMOL/L (ref 21–32)
CREAT SERPL-MCNC: 1.07 MG/DL (ref 0.7–1.3)
GLOBULIN SER CALC-MCNC: 3 G/DL (ref 2–4)
GLUCOSE SERPL-MCNC: 102 MG/DL (ref 65–100)
HDLC SERPL-MCNC: 39 MG/DL
HDLC SERPL: 4.1 {RATIO} (ref 0–5)
LDLC SERPL CALC-MCNC: 52.4 MG/DL (ref 0–100)
POTASSIUM SERPL-SCNC: 4.6 MMOL/L (ref 3.5–5.1)
PROT SERPL-MCNC: 7.5 G/DL (ref 6.4–8.2)
SODIUM SERPL-SCNC: 141 MMOL/L (ref 136–145)
TRIGL SERPL-MCNC: 333 MG/DL (ref ?–150)
VLDLC SERPL CALC-MCNC: 66.6 MG/DL

## 2022-08-21 RX ORDER — SERTRALINE HYDROCHLORIDE 50 MG/1
50 TABLET, FILM COATED ORAL DAILY
Qty: 90 TABLET | Refills: 1 | Status: SHIPPED | OUTPATIENT
Start: 2022-08-21

## 2022-08-23 NOTE — PROGRESS NOTES
Vitamin D kidney and liver function are normal.  Total cholesterol and LDL have improved. Triglycerides are slightly elevated but they have improved too.   Please take fish oil 1 g twice daily over-the-counter

## 2022-11-02 DIAGNOSIS — E78.00 HYPERCHOLESTEREMIA: ICD-10-CM

## 2022-11-04 RX ORDER — ATORVASTATIN CALCIUM 20 MG/1
TABLET, FILM COATED ORAL
Qty: 90 TABLET | Refills: 1 | Status: SHIPPED | OUTPATIENT
Start: 2022-11-04

## 2022-11-04 RX ORDER — ATORVASTATIN CALCIUM 20 MG/1
20 TABLET, FILM COATED ORAL DAILY
Qty: 90 TABLET | Refills: 1 | Status: SHIPPED | OUTPATIENT
Start: 2022-11-04

## 2023-02-20 ENCOUNTER — HOSPITAL ENCOUNTER (OUTPATIENT)
Dept: GENERAL RADIOLOGY | Age: 48
Discharge: HOME | End: 2023-02-20
Attending: INTERNAL MEDICINE
Payer: COMMERCIAL

## 2023-02-20 ENCOUNTER — OFFICE VISIT (OUTPATIENT)
Dept: PRIMARY CARE CLINIC | Age: 48
End: 2023-02-20
Payer: COMMERCIAL

## 2023-02-20 VITALS
HEIGHT: 73 IN | RESPIRATION RATE: 18 BRPM | BODY MASS INDEX: 32.07 KG/M2 | DIASTOLIC BLOOD PRESSURE: 83 MMHG | SYSTOLIC BLOOD PRESSURE: 123 MMHG | TEMPERATURE: 97.8 F | HEART RATE: 73 BPM | WEIGHT: 242 LBS | OXYGEN SATURATION: 96 %

## 2023-02-20 DIAGNOSIS — G89.29 CHRONIC MIDLINE LOW BACK PAIN WITHOUT SCIATICA: ICD-10-CM

## 2023-02-20 DIAGNOSIS — Z23 NEED FOR TDAP VACCINATION: ICD-10-CM

## 2023-02-20 DIAGNOSIS — Z00.00 ANNUAL PHYSICAL EXAM: Primary | ICD-10-CM

## 2023-02-20 DIAGNOSIS — M54.50 CHRONIC MIDLINE LOW BACK PAIN WITHOUT SCIATICA: ICD-10-CM

## 2023-02-20 DIAGNOSIS — R73.01 IFG (IMPAIRED FASTING GLUCOSE): ICD-10-CM

## 2023-02-20 DIAGNOSIS — Z23 ENCOUNTER FOR IMMUNIZATION: ICD-10-CM

## 2023-02-20 DIAGNOSIS — Z12.11 COLON CANCER SCREENING: ICD-10-CM

## 2023-02-20 DIAGNOSIS — E78.00 HYPERCHOLESTEREMIA: ICD-10-CM

## 2023-02-20 DIAGNOSIS — F32.A DEPRESSION, UNSPECIFIED DEPRESSION TYPE: ICD-10-CM

## 2023-02-20 PROCEDURE — 90715 TDAP VACCINE 7 YRS/> IM: CPT | Performed by: INTERNAL MEDICINE

## 2023-02-20 PROCEDURE — 90471 IMMUNIZATION ADMIN: CPT | Performed by: INTERNAL MEDICINE

## 2023-02-20 PROCEDURE — 99396 PREV VISIT EST AGE 40-64: CPT | Performed by: INTERNAL MEDICINE

## 2023-02-20 PROCEDURE — 72100 X-RAY EXAM L-S SPINE 2/3 VWS: CPT

## 2023-02-20 RX ORDER — TETANUS TOXOID, REDUCED DIPHTHERIA TOXOID AND ACELLULAR PERTUSSIS VACCINE, ADSORBED 5; 2.5; 8; 8; 2.5 [IU]/.5ML; [IU]/.5ML; UG/.5ML; UG/.5ML; UG/.5ML
0.5 SUSPENSION INTRAMUSCULAR ONCE
Qty: 0.5 ML | Refills: 0 | Status: SHIPPED | OUTPATIENT
Start: 2023-02-20 | End: 2023-02-20

## 2023-02-20 RX ORDER — SERTRALINE HYDROCHLORIDE 50 MG/1
50 TABLET, FILM COATED ORAL DAILY
Qty: 90 TABLET | Refills: 3 | Status: SHIPPED | OUTPATIENT
Start: 2023-02-20

## 2023-02-20 RX ORDER — ATORVASTATIN CALCIUM 20 MG/1
20 TABLET, FILM COATED ORAL DAILY
Qty: 90 TABLET | Refills: 3 | Status: SHIPPED | OUTPATIENT
Start: 2023-02-20

## 2023-02-20 NOTE — PROGRESS NOTES
Shaneka Bhakta is a 56 y.o.  male and presents with     Chief Complaint   Patient presents with    Physical     Pt is having lower back pain and has seen [facility]iropracter . Pt wants to see Physical therapy. Is trying to lower his wt. Denies any chest pain shortness of breath orthopnea PND or leg swelling      Past Medical History:   Diagnosis Date    [address] [facility]      No past surgical history on file. Current Outpatient Medications   Medication Sig    diphth,pertus,acell,,tetanus (Boostrix Tdap) 2.5-8-5 Lf-mcg-Lf/0.5mL susp suspension 0.5 mL by IntraMUSCular route once for 1 dose. atorvastatin (LIPITOR) 20 mg tablet Take 1 Tablet by mouth daily. sertraline (ZOLOFT) 50 mg tablet Take 1 Tablet by mouth daily. atorvastatin (LIPITOR) 20 mg tablet Take 1 Tablet by mouth daily. ergocalciferol (ERGOCALCIFEROL) 1,250 mcg (50,000 unit) capsule Take 1 Capsule by mouth every seven (7) days. Cetirizine 10 mg cap Take  by mouth. No current facility-administered medications for this visit.      Health Maintenance   Topic Date Due    COVID-19 Vaccine (1) Never done    Colorectal Cancer Screening Combo  Never done    Flu Vaccine (1) 08/01/2022    Lipid Screen  08/19/2023    Depression Monitoring  02/20/2024    DTaP/Tdap/Td series (3 - Td or Tdap) 02/20/2033    Pneumococcal 0-64 years  Aged Out    Hepatitis C Screening  Discontinued     Immunization History   Administered Date(s) Administered    Hep A Vaccine 03/08/1999, 07/21/2009    Hep B Vaccine 04/09/1999, 05/04/1999    Influenza Vaccine 12/01/2017    Influenza, FLUARIX, FLULAVAL, [place] [facility] (age 10 mo+) AND AFLURIA, (age 1 y+), PF, 0.5mL 11/16/2021    Japanese Encephalitis Vaccine 03/08/1999, 03/30/1999, 04/23/1999    Meningococcal ACWY Vaccine 03/08/1999    Poliovirus vaccine 05/28/1999    Rabies Vaccine 05/04/1999, 05/28/1999    Td 03/08/1999    Tdap 07/21/2009, 02/20/2023    Typhoid Vaccine 03/08/1999    Yellow Fever Vaccine 07/28/2009     No LMP for male patient. Allergies and Intolerances:   No Known Allergies    Family History:   Family History   Problem Relation Age of Onset    Alcohol abuse Mother     Psychiatric Disorder Mother     Diabetes Father     Heart Disease Father        Social History:   He  reports that he has never smoked. He has never used smokeless tobacco.  He  reports no history of alcohol use. Review of Systems:   General: negative for - chills, fatigue, fever, weight change  Psych: negative for - anxiety, depression, irritability or mood swings  ENT: negative for - headaches, hearing change, nasal congestion, oral lesions, sneezing or sore throat  Heme/ Lymph: negative for - bleeding problems, bruising, pallor or swollen lymph nodes  Endo: negative for - hot flashes, polydipsia/polyuria or temperature intolerance  Resp: negative for - cough, shortness of breath or wheezing  CV: negative for - chest pain, edema or palpitations  GI: negative for - abdominal pain, change in bowel habits, constipation, diarrhea or nausea/vomiting  : negative for - dysuria, hematuria, incontinence, pelvic pain or vulvar/vaginal symptoms  MSK: negative for - joint pain, joint swelling or muscle pain  Neuro: negative for - confusion, headaches, seizures or weakness  Derm: negative for - dry skin, hair changes, rash or skin lesion changes          Physical:   Vitals:   Vitals:    02/20/23 0905   BP: 123/83   Pulse: 73   Resp: 18   Temp: 97.8 °F (36.6 °C)   TempSrc: Temporal   SpO2: 96%   Weight: 242 lb (109.8 kg)   Height: 6' 1\" (1.854 m)           Exam:   HEENT- atraumatic,normocephalic, awake, oriented, well nourished  Neck - supple,no enlarged lymph nodes, no JVD, no thyromegaly  Chest- CTA, no rhonchi, no crackles  Heart- rrr, no murmurs / gallop/rub  Abdomen- soft,BS+,NT, no hepatosplenomegaly  Ext - no c/c/edema   Neuro- no focal deficits. Power 5/5 all extremities  Skin - warm,dry, no obvious rashes.           Review of Data:   LABS:   Lab Results   Component Value Date/Time    WBC 5.4 06/18/2021 09:47 AM    HGB 14.9 06/18/2021 09:47 AM    HCT 44.3 06/18/2021 09:47 AM    PLATELET 591 62/38/8705 09:47 AM     Lab Results   Component Value Date/Time    Sodium 141 08/19/2022 11:04 AM    Potassium 4.6 08/19/2022 11:04 AM    Chloride 108 08/19/2022 11:04 AM    CO2 29 08/19/2022 11:04 AM    Glucose 102 (H) 08/19/2022 11:04 AM    BUN 15 08/19/2022 11:04 AM    Creatinine 1.07 08/19/2022 11:04 AM     Lab Results   Component Value Date/Time    Cholesterol, total 158 08/19/2022 11:04 AM    HDL Cholesterol 39 08/19/2022 11:04 AM    LDL, calculated 52.4 08/19/2022 11:04 AM    Triglyceride 333 (H) 08/19/2022 11:04 AM     No components found for: GPT        Impression / Plan:        ICD-10-CM ICD-9-CM    1. Annual physical exam  Z00.00 V70.0 LIPID PANEL      METABOLIC PANEL, COMPREHENSIVE      HEMOGLOBIN A1C WITH EAG      TSH 3RD GENERATION      CBC WITH AUTOMATED DIFF      2. Hypercholesteremia  E78.00 272.0 LIPID PANEL      atorvastatin (LIPITOR) 20 mg tablet      3. Depression, unspecified depression type  F32. A 311 sertraline (ZOLOFT) 50 mg tablet      4. IFG (impaired fasting glucose)  R73.01 790.21       5. Colon cancer screening  Z12.11 V76.51 REFERRAL TO GASTROENTEROLOGY      6. Need for Tdap vaccination  Z23 V06.1 diphth,pertus,acell,,tetanus (Boostrix Tdap) 2.5-8-5 Lf-mcg-Lf/0.5mL susp suspension      7. Chronic midline low back pain without sciatica  M54.50 724.2 XR SPINE LUMB 2 OR 3 V    G89.29 338.29 REFERRAL TO PHYSICAL THERAPY      XR SPINE LUMB 2 OR 3 V      8. Encounter for immunization  Z23 V03.89 TDAP, BOOSTRIX, (AGE 10 YRS+), IM          Explained to patient risk benefits of the medications. Advised patient to stop meds if having any side effects. Pt verbalized understanding of the instructions. I have discussed the diagnosis with the patient and the intended plan as seen in the above orders.   The patient has received an after-visit summary and questions were answered concerning future plans. I have discussed medication side effects and warnings with the patient as well. I have reviewed the plan of care with the patient, accepted their input and they are in agreement with the treatment goals. Reviewed plan of care. Patient has provided input and agrees with goals. Follow-up and Dispositions    Return in about 1 year (around 2/20/2024).          Costa Valera MD

## 2023-02-20 NOTE — PROGRESS NOTES
Chief Complaint   Patient presents with    Physical       There were no vitals taken for this visit. 1. Have you been to the ER, urgent care clinic since your last visit? Hospitalized since your last visit? No    2. Have you seen or consulted any other health care providers outside of the [address] [place] [address] since your last visit? Include any pap smears or colon screening. No      3. For patients aged 39-70: Has the patient had a colonoscopy / FIT/ Cologuard?  No

## 2023-02-21 NOTE — PROGRESS NOTES
X-ray of the lumbar spine shows some scoliosis with degenerative disc disease.   Please proceed with physical therapy as discussed

## 2023-05-20 RX ORDER — ATORVASTATIN CALCIUM 20 MG/1
20 TABLET, FILM COATED ORAL DAILY
COMMUNITY
Start: 2022-11-04

## 2023-05-20 RX ORDER — ERGOCALCIFEROL 1.25 MG/1
50000 CAPSULE ORAL
COMMUNITY
Start: 2021-11-16

## 2024-01-18 DIAGNOSIS — F32.A DEPRESSION, UNSPECIFIED: ICD-10-CM

## 2024-01-18 NOTE — TELEPHONE ENCOUNTER
PCP: Amish Figueroa MD    Last Visit 2/20/2023   Future Appointments   Date Time Provider Department Center   2/21/2024  9:00 AM Amish Figueroa MD [facility]       Requested Prescriptions     Pending Prescriptions Disp Refills    sertraline (ZOLOFT) 50 MG tablet [Pharmacy Med Name: SERTRALINE HCL 50 MG TABLET] 90 tablet 3     Sig: TAKE 1 TABLET BY MOUTH EVERY DAY         Other Comments: Last Refill

## 2024-02-10 DIAGNOSIS — F32.A DEPRESSION, UNSPECIFIED: ICD-10-CM

## 2024-02-21 ENCOUNTER — OFFICE VISIT (OUTPATIENT)
Dept: PRIMARY CARE CLINIC | Facility: CLINIC | Age: 49
End: 2024-02-21
Payer: COMMERCIAL

## 2024-02-21 VITALS
RESPIRATION RATE: 18 BRPM | HEIGHT: 73 IN | SYSTOLIC BLOOD PRESSURE: 120 MMHG | BODY MASS INDEX: 31.54 KG/M2 | WEIGHT: 238 LBS | OXYGEN SATURATION: 99 % | TEMPERATURE: 97.8 F | DIASTOLIC BLOOD PRESSURE: 80 MMHG | HEART RATE: 67 BPM

## 2024-02-21 DIAGNOSIS — G44.82 ORGASMIC HEADACHE: ICD-10-CM

## 2024-02-21 DIAGNOSIS — J34.2 DEVIATED NASAL SEPTUM: ICD-10-CM

## 2024-02-21 DIAGNOSIS — E78.00 PURE HYPERCHOLESTEROLEMIA, UNSPECIFIED: ICD-10-CM

## 2024-02-21 DIAGNOSIS — Z00.00 ANNUAL PHYSICAL EXAM: ICD-10-CM

## 2024-02-21 DIAGNOSIS — E55.9 VITAMIN D DEFICIENCY: ICD-10-CM

## 2024-02-21 DIAGNOSIS — F32.89 OTHER DEPRESSION: Primary | ICD-10-CM

## 2024-02-21 DIAGNOSIS — Z86.19 HISTORY OF SAN JOAQUIN VALLEY FEVER: ICD-10-CM

## 2024-02-21 LAB
25(OH)D3 SERPL-MCNC: 20.7 NG/ML (ref 30–100)
ERYTHROCYTE [DISTWIDTH] IN BLOOD BY AUTOMATED COUNT: 12.5 % (ref 11.5–14.5)
ERYTHROCYTE [SEDIMENTATION RATE] IN BLOOD: 2 MM/HR (ref 0–15)
EST. AVERAGE GLUCOSE BLD GHB EST-MCNC: 114 MG/DL
HBA1C MFR BLD: 5.6 % (ref 4–5.6)
HCT VFR BLD AUTO: 44.6 % (ref 36.6–50.3)
HGB BLD-MCNC: 14.7 G/DL (ref 12.1–17)
MCH RBC QN AUTO: 29.1 PG (ref 26–34)
MCHC RBC AUTO-ENTMCNC: 33 G/DL (ref 30–36.5)
MCV RBC AUTO: 88.3 FL (ref 80–99)
NRBC # BLD: 0 K/UL (ref 0–0.01)
NRBC BLD-RTO: 0 PER 100 WBC
PLATELET # BLD AUTO: 260 K/UL (ref 150–400)
PMV BLD AUTO: 10 FL (ref 8.9–12.9)
RBC # BLD AUTO: 5.05 M/UL (ref 4.1–5.7)
TSH SERPL DL<=0.05 MIU/L-ACNC: 0.63 UIU/ML (ref 0.36–3.74)
WBC # BLD AUTO: 5.5 K/UL (ref 4.1–11.1)

## 2024-02-21 PROCEDURE — 99396 PREV VISIT EST AGE 40-64: CPT | Performed by: INTERNAL MEDICINE

## 2024-02-21 RX ORDER — ATORVASTATIN CALCIUM 20 MG/1
20 TABLET, FILM COATED ORAL DAILY
Qty: 90 TABLET | Refills: 3 | Status: SHIPPED | OUTPATIENT
Start: 2024-02-21

## 2024-02-21 SDOH — ECONOMIC STABILITY: FOOD INSECURITY: WITHIN THE PAST 12 MONTHS, YOU WORRIED THAT YOUR FOOD WOULD RUN OUT BEFORE YOU GOT MONEY TO BUY MORE.: NEVER TRUE

## 2024-02-21 SDOH — ECONOMIC STABILITY: HOUSING INSECURITY
IN THE LAST 12 MONTHS, WAS THERE A TIME WHEN YOU DID NOT HAVE A STEADY PLACE TO SLEEP OR SLEPT IN A SHELTER (INCLUDING NOW)?: NO

## 2024-02-21 SDOH — ECONOMIC STABILITY: FOOD INSECURITY: WITHIN THE PAST 12 MONTHS, THE FOOD YOU BOUGHT JUST DIDN'T LAST AND YOU DIDN'T HAVE MONEY TO GET MORE.: NEVER TRUE

## 2024-02-21 ASSESSMENT — PATIENT HEALTH QUESTIONNAIRE - PHQ9
9. THOUGHTS THAT YOU WOULD BE BETTER OFF DEAD, OR OF HURTING YOURSELF: 0
10. IF YOU CHECKED OFF ANY PROBLEMS, HOW DIFFICULT HAVE THESE PROBLEMS MADE IT FOR YOU TO DO YOUR WORK, TAKE CARE OF THINGS AT HOME, OR GET ALONG WITH OTHER PEOPLE: 0
SUM OF ALL RESPONSES TO PHQ QUESTIONS 1-9: 0
1. LITTLE INTEREST OR PLEASURE IN DOING THINGS: 0
5. POOR APPETITE OR OVEREATING: 0
6. FEELING BAD ABOUT YOURSELF - OR THAT YOU ARE A FAILURE OR HAVE LET YOURSELF OR YOUR FAMILY DOWN: 0
4. FEELING TIRED OR HAVING LITTLE ENERGY: 0
2. FEELING DOWN, DEPRESSED OR HOPELESS: 0
SUM OF ALL RESPONSES TO PHQ QUESTIONS 1-9: 0
7. TROUBLE CONCENTRATING ON THINGS, SUCH AS READING THE NEWSPAPER OR WATCHING TELEVISION: 0
3. TROUBLE FALLING OR STAYING ASLEEP: 0
SUM OF ALL RESPONSES TO PHQ QUESTIONS 1-9: 0
8. MOVING OR SPEAKING SO SLOWLY THAT OTHER PEOPLE COULD HAVE NOTICED. OR THE OPPOSITE, BEING SO FIGETY OR RESTLESS THAT YOU HAVE BEEN MOVING AROUND A LOT MORE THAN USUAL: 0
SUM OF ALL RESPONSES TO PHQ9 QUESTIONS 1 & 2: 0
SUM OF ALL RESPONSES TO PHQ QUESTIONS 1-9: 0

## 2024-02-21 NOTE — PROGRESS NOTES
\"Have you been to the ER, urgent care clinic since your last visit?  Hospitalized since your last visit?\"    NO    “Have you seen or consulted any other health care providers outside of [facility] since your last visit?”    NO    “Have you had a colorectal cancer screening such as a colonoscopy/FIT/Cologuard?    NO       
03/30/1999, 04/23/1999    Meningococcal ACWY Vaccine 03/08/1999    Polio Virus Vaccine 05/28/1999    Rabies vaccine 05/04/1999, 05/28/1999    TDaP, ADACEL (age 10y-64y), BOOSTRIX (age 10y+), IM, 0.5mL 07/21/2009, 02/20/2023    Td vaccine (adult) 03/08/1999    Typhoid Vaccine 03/08/1999    Yellow Fever (YF-Vax) 07/28/2009     No LMP for male patient.        Allergies and Intolerances:   No Known Allergies    Family History:   Family History   Problem Relation Age of Onset    Alcohol Abuse Mother     Psychiatric Disorder Mother     Mental Illness Mother     Diabetes Father     Heart Disease Father        Social History:   He  reports that he has never smoked. He has never used smokeless tobacco.  He  reports no history of alcohol use.            Review of Systems:   General: negative for - chills, fatigue, fever, weight change  Psych: negative for - anxiety, depression, irritability or mood swings  ENT: negative for - headaches, hearing change, nasal congestion, oral lesions, sneezing or sore throat  Heme/ Lymph: negative for - bleeding problems, bruising, pallor or swollen lymph nodes  Endo: negative for - hot flashes, polydipsia/polyuria or temperature intolerance  Resp: negative for - cough, shortness of breath or wheezing  CV: negative for - chest pain, edema or palpitations  GI: negative for - abdominal pain, change in bowel habits, constipation, diarrhea or nausea/vomiting  : negative for - dysuria, hematuria, incontinence, pelvic pain or vulvar/vaginal symptoms  MSK: negative for - joint pain, joint swelling or muscle pain  Neuro: negative for - confusion, headaches, seizures or weakness  Derm: negative for - dry skin, hair changes, rash or skin lesion changes          Physical:   Vitals:   Vitals:    02/21/24 0852   BP: 120/80   Pulse: 67   Resp: 18   Temp: 97.8 °F (36.6 °C)   TempSrc: Oral   SpO2: 99%   Weight: 108 kg (238 lb)   Height: 1.854 m (6' 1\")           Exam:   HEENT- atraumatic,normocephalic,

## 2024-02-22 LAB
ALBUMIN SERPL-MCNC: 4.5 G/DL (ref 3.5–5)
ALBUMIN/GLOB SERPL: 1.5 (ref 1.1–2.2)
ALP SERPL-CCNC: 63 U/L (ref 45–117)
ALT SERPL-CCNC: 49 U/L (ref 12–78)
ANION GAP SERPL CALC-SCNC: 2 MMOL/L (ref 5–15)
AST SERPL-CCNC: 19 U/L (ref 15–37)
BILIRUB SERPL-MCNC: 1.1 MG/DL (ref 0.2–1)
BUN SERPL-MCNC: 14 MG/DL (ref 6–20)
BUN/CREAT SERPL: 13 (ref 12–20)
CALCIUM SERPL-MCNC: 9.4 MG/DL (ref 8.5–10.1)
CHLORIDE SERPL-SCNC: 109 MMOL/L (ref 97–108)
CHOLEST SERPL-MCNC: 128 MG/DL
CO2 SERPL-SCNC: 29 MMOL/L (ref 21–32)
CREAT SERPL-MCNC: 1.09 MG/DL (ref 0.7–1.3)
GLOBULIN SER CALC-MCNC: 3 G/DL (ref 2–4)
GLUCOSE SERPL-MCNC: 100 MG/DL (ref 65–100)
HDLC SERPL-MCNC: 33 MG/DL
HDLC SERPL: 3.9 (ref 0–5)
LDLC SERPL CALC-MCNC: 60 MG/DL (ref 0–100)
POTASSIUM SERPL-SCNC: 3.9 MMOL/L (ref 3.5–5.1)
PROT SERPL-MCNC: 7.5 G/DL (ref 6.4–8.2)
SODIUM SERPL-SCNC: 140 MMOL/L (ref 136–145)
TRIGL SERPL-MCNC: 175 MG/DL
VLDLC SERPL CALC-MCNC: 35 MG/DL

## 2024-04-19 ENCOUNTER — COMMUNITY OUTREACH (OUTPATIENT)
Dept: PRIMARY CARE CLINIC | Facility: CLINIC | Age: 49
End: 2024-04-19

## 2024-10-31 ENCOUNTER — OFFICE VISIT (OUTPATIENT)
Age: 49
End: 2024-10-31
Payer: COMMERCIAL

## 2024-10-31 VITALS
OXYGEN SATURATION: 97 % | HEART RATE: 78 BPM | DIASTOLIC BLOOD PRESSURE: 86 MMHG | SYSTOLIC BLOOD PRESSURE: 124 MMHG | HEIGHT: 75 IN | BODY MASS INDEX: 29.84 KG/M2 | WEIGHT: 240 LBS

## 2024-10-31 DIAGNOSIS — R51.9 CHRONIC DAILY HEADACHE: ICD-10-CM

## 2024-10-31 DIAGNOSIS — G44.82 HEADACHE ASSOCIATED WITH ORGASM: Primary | ICD-10-CM

## 2024-10-31 DIAGNOSIS — Z72.820 SLEEP DEFICIENT: ICD-10-CM

## 2024-10-31 PROCEDURE — 99204 OFFICE O/P NEW MOD 45 MIN: CPT | Performed by: PSYCHIATRY & NEUROLOGY

## 2024-10-31 NOTE — PROGRESS NOTES
Chief Complaint   Patient presents with    Neurologic Problem     Hx of headaches post intercourse, \"Happened 2 years ago a few times over a week, and then again a year ago, but I have also recently been having daily headaches.\"       HISTORY OF PRESENT ILLNESS  Wade Willoughby is a 49 y.o. male who comes in to discuss headaches that he has been experiencing over the past several years.  He describes 2 types of headaches.  1 is related to sexual activity and occurs at the time of orgasm.  It has happened a few times over the past couple of years and it would tend to cluster together over a few weeks and then would subside.  This currently seems to be in remission.  He also keeps a mild headache at a scale of 1-2/10 most days out of the week.  It feels like a pressure in both temporal regions but sometimes it becomes moderate in intensity where he feels the need to take an Advil and this may be once or twice a week.  This has been going on for more than 20 years.  There is no associated photophobia, phonophobia, nausea or vomiting.  No other focal motor or sensory deficits.  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  Does not get much sleep and usually goes to bed at 230 or 3 AM.  Does not typically nap during the day.  See      Past Medical History:   Diagnosis Date    Depression most adult life    Headache 15-20 years    [facility] fever      Current Outpatient Medications   Medication Sig    atorvastatin (LIPITOR) 20 MG tablet Take 1 tablet by mouth daily    sertraline (ZOLOFT) 50 MG tablet Take 1 tablet by mouth daily    Cetirizine HCl 10 MG CAPS Take by mouth    ergocalciferol (ERGOCALCIFEROL) 1.25 MG (52888 UT) capsule Take 1 capsule by mouth every 7 days     No current facility-administered medications for this visit.     No Known Allergies  Family History   Problem Relation Age of Onset    Alcohol Abuse Mother     Psychiatric Disorder Mother     Mental Illness Mother     Diabetes Father

## 2024-10-31 NOTE — PROGRESS NOTES
Chief Complaint   Patient presents with    Neurologic Problem     Hx of headaches post intercourse, \"Happened 2 years ago a few times over a week, and then again a year ago, but I have also recently been having daily headaches.\"     Vitals:    10/31/24 0902   BP: 124/86   Pulse: 78   SpO2: 97%

## 2025-03-28 DIAGNOSIS — E78.00 PURE HYPERCHOLESTEROLEMIA, UNSPECIFIED: ICD-10-CM

## 2025-03-28 DIAGNOSIS — F32.89 OTHER DEPRESSION: ICD-10-CM

## 2025-03-31 RX ORDER — ATORVASTATIN CALCIUM 20 MG/1
20 TABLET, FILM COATED ORAL DAILY
Qty: 30 TABLET | Refills: 0 | Status: SHIPPED | OUTPATIENT
Start: 2025-03-31

## 2025-03-31 NOTE — TELEPHONE ENCOUNTER
PCP: Amish Figueroa MD    Last Visit 2/21/2024   No future appointments.    Requested Prescriptions     Pending Prescriptions Disp Refills    sertraline (ZOLOFT) 50 MG tablet [Pharmacy Med Name: SERTRALINE HCL 50 MG TABLET] 90 tablet 3     Sig: TAKE 1 TABLET BY MOUTH EVERY DAY    atorvastatin (LIPITOR) 20 MG tablet [Pharmacy Med Name: ATORVASTATIN 20 MG TABLET] 90 tablet 3     Sig: TAKE 1 TABLET BY MOUTH EVERY DAY         Other Comments: Last Refill

## 2025-04-27 DIAGNOSIS — E78.00 PURE HYPERCHOLESTEROLEMIA, UNSPECIFIED: ICD-10-CM

## 2025-04-27 DIAGNOSIS — F32.89 OTHER DEPRESSION: ICD-10-CM

## 2025-04-28 RX ORDER — ATORVASTATIN CALCIUM 20 MG/1
20 TABLET, FILM COATED ORAL DAILY
Qty: 90 TABLET | Refills: 0 | Status: SHIPPED | OUTPATIENT
Start: 2025-04-28

## 2025-04-28 NOTE — TELEPHONE ENCOUNTER
PCP: Amish Figueroa MD    Last Visit 2/21/2024   Future Appointments   Date Time Provider Department Center   7/2/2025 12:45 PM Amish Figueroa MD [facility]       Requested Prescriptions     Pending Prescriptions Disp Refills    sertraline (ZOLOFT) 50 MG tablet [Pharmacy Med Name: SERTRALINE HCL 50 MG TABLET] 90 tablet 1     Sig: TAKE 1 TABLET BY MOUTH DAILY NEED APPOINTMENT    atorvastatin (LIPITOR) 20 MG tablet [Pharmacy Med Name: ATORVASTATIN 20 MG TABLET] 90 tablet 1     Sig: TAKE 1 TABLET BY MOUTH DAILY         Other Comments: Last Refill   03/31/25

## 2025-07-02 ENCOUNTER — OFFICE VISIT (OUTPATIENT)
Dept: PRIMARY CARE CLINIC | Facility: CLINIC | Age: 50
End: 2025-07-02
Payer: COMMERCIAL

## 2025-07-02 VITALS
OXYGEN SATURATION: 95 % | TEMPERATURE: 98.4 F | SYSTOLIC BLOOD PRESSURE: 126 MMHG | BODY MASS INDEX: 30.34 KG/M2 | HEIGHT: 75 IN | HEART RATE: 69 BPM | RESPIRATION RATE: 16 BRPM | WEIGHT: 244 LBS | DIASTOLIC BLOOD PRESSURE: 80 MMHG

## 2025-07-02 DIAGNOSIS — Z00.00 ANNUAL PHYSICAL EXAM: Primary | ICD-10-CM

## 2025-07-02 DIAGNOSIS — E78.00 PURE HYPERCHOLESTEROLEMIA, UNSPECIFIED: ICD-10-CM

## 2025-07-02 DIAGNOSIS — E55.9 VITAMIN D DEFICIENCY: ICD-10-CM

## 2025-07-02 DIAGNOSIS — F32.89 OTHER DEPRESSION: ICD-10-CM

## 2025-07-02 PROCEDURE — 99396 PREV VISIT EST AGE 40-64: CPT | Performed by: INTERNAL MEDICINE

## 2025-07-02 RX ORDER — ATORVASTATIN CALCIUM 20 MG/1
20 TABLET, FILM COATED ORAL DAILY
Qty: 90 TABLET | Refills: 3 | Status: SHIPPED | OUTPATIENT
Start: 2025-07-02

## 2025-07-02 SDOH — ECONOMIC STABILITY: FOOD INSECURITY: WITHIN THE PAST 12 MONTHS, YOU WORRIED THAT YOUR FOOD WOULD RUN OUT BEFORE YOU GOT MONEY TO BUY MORE.: NEVER TRUE

## 2025-07-02 SDOH — ECONOMIC STABILITY: FOOD INSECURITY: WITHIN THE PAST 12 MONTHS, THE FOOD YOU BOUGHT JUST DIDN'T LAST AND YOU DIDN'T HAVE MONEY TO GET MORE.: NEVER TRUE

## 2025-07-02 ASSESSMENT — PATIENT HEALTH QUESTIONNAIRE - PHQ9
SUM OF ALL RESPONSES TO PHQ QUESTIONS 1-9: 0
1. LITTLE INTEREST OR PLEASURE IN DOING THINGS: NOT AT ALL
SUM OF ALL RESPONSES TO PHQ QUESTIONS 1-9: 0
2. FEELING DOWN, DEPRESSED OR HOPELESS: NOT AT ALL

## 2025-07-02 NOTE — PROGRESS NOTES
Wade Willoughby is a 50 y.o. male and presents with     Chief Complaint   Patient presents with    Annual Exam    Medication Refill       History of Present Illness  Patient presents for routine checkup. Colonoscopy in spring/summer 2024 was normal. Repeat in 10 years.    SOCIAL HISTORY  He is a professor at [employer].     Needs med refills  Doing well        Past Medical History:   Diagnosis Date    Depression most adult life    Headache 15-20 years    [place] fever      No past surgical history on file.  Current Outpatient Medications   Medication Sig    atorvastatin (LIPITOR) 20 MG tablet Take 1 tablet by mouth daily    sertraline (ZOLOFT) 50 MG tablet Take 1 tablet by mouth daily Need Appointment    Cetirizine HCl 10 MG CAPS Take by mouth     No current facility-administered medications for this visit.     Health Maintenance   Topic Date Due    HIV screen  Never done    Polio vaccine (2 of 3 - Adult catch-up series) 06/25/1999    Hepatitis B vaccine (3 of 3 - 19+ 3-dose series) 10/09/1999    COVID-19 Vaccine (1 - 2024-25 season) Never done    Lipids  02/21/2025    Flu vaccine (1) 08/01/2025    Depression Monitoring  07/02/2026    DTaP/Tdap/Td vaccine (3 - Td or Tdap) 02/20/2033    Colorectal Cancer Screen  07/05/2034    Hepatitis A vaccine  Aged Out    Hib vaccine  Aged Out    Meningococcal (ACWY) vaccine  Aged Out    Meningococcal B vaccine  Aged Out    Pneumococcal 0-49 years Vaccine  Aged Out    Diabetes screen  Discontinued    Hepatitis C screen  Discontinued     Immunization History   Administered Date(s) Administered    Hepatitis A Vaccine 03/08/1999, 07/21/2009    Hepatitis B vaccine 04/09/1999, 05/04/1999    Influenza Virus Vaccine 12/01/2017    Influenza, FLUARIX, FLULAVAL, FLUZONE (age 6 mo+) and AFLURIA, (age 3 y+), Quadv PF, 0.5mL 11/16/2021    Japanese Encephalitis Vaccine 03/08/1999, 03/30/1999, 04/23/1999    Meningococcal ACWY Vaccine 03/08/1999    Polio Virus Vaccine 05/28/1999    Rabies

## 2025-07-02 NOTE — PROGRESS NOTES
\"Have you been to the ER, urgent care clinic since your last visit?  Hospitalized since your last visit?\"    NO    “Have you seen or consulted any other health care providers outside of [facility] since your last visit?”    NO        “Have you had a colorectal cancer screening such as a colonoscopy/FIT/Cologuard?    Summer of 2024     No colonoscopy on file  No cologuard on file  No FIT/FOBT on file   No flexible sigmoidoscopy on file         Click Here for Release of Records Request